# Patient Record
Sex: MALE | Race: WHITE | NOT HISPANIC OR LATINO | Employment: UNEMPLOYED | URBAN - METROPOLITAN AREA
[De-identification: names, ages, dates, MRNs, and addresses within clinical notes are randomized per-mention and may not be internally consistent; named-entity substitution may affect disease eponyms.]

---

## 2019-03-29 ENCOUNTER — TRANSCRIBE ORDERS (OUTPATIENT)
Dept: ADMINISTRATIVE | Facility: HOSPITAL | Age: 34
End: 2019-03-29

## 2019-03-29 DIAGNOSIS — G47.30 SLEEP APNEA, UNSPECIFIED TYPE: Primary | ICD-10-CM

## 2019-06-11 ENCOUNTER — TRANSCRIBE ORDERS (OUTPATIENT)
Dept: SLEEP CENTER | Facility: CLINIC | Age: 34
End: 2019-06-11

## 2019-06-11 DIAGNOSIS — G47.30 SLEEP APNEA: Primary | ICD-10-CM

## 2019-06-13 ENCOUNTER — OFFICE VISIT (OUTPATIENT)
Dept: SLEEP CENTER | Facility: CLINIC | Age: 34
End: 2019-06-13
Payer: COMMERCIAL

## 2019-06-13 VITALS
DIASTOLIC BLOOD PRESSURE: 78 MMHG | BODY MASS INDEX: 41.75 KG/M2 | WEIGHT: 315 LBS | HEIGHT: 73 IN | HEART RATE: 74 BPM | SYSTOLIC BLOOD PRESSURE: 132 MMHG

## 2019-06-13 DIAGNOSIS — G47.33 OSA (OBSTRUCTIVE SLEEP APNEA): Primary | ICD-10-CM

## 2019-06-13 DIAGNOSIS — K21.9 CHRONIC GERD: ICD-10-CM

## 2019-06-13 DIAGNOSIS — J45.909 ASTHMA DUE TO SEASONAL ALLERGIES: ICD-10-CM

## 2019-06-13 DIAGNOSIS — E66.01 CLASS 3 SEVERE OBESITY DUE TO EXCESS CALORIES WITHOUT SERIOUS COMORBIDITY WITH BODY MASS INDEX (BMI) OF 45.0 TO 49.9 IN ADULT (HCC): ICD-10-CM

## 2019-06-13 PROCEDURE — 99244 OFF/OP CNSLTJ NEW/EST MOD 40: CPT | Performed by: PSYCHIATRY & NEUROLOGY

## 2019-06-13 RX ORDER — ESCITALOPRAM OXALATE 5 MG/1
TABLET ORAL
Refills: 1 | COMMUNITY
Start: 2019-06-06

## 2019-06-13 RX ORDER — FLUTICASONE PROPIONATE 50 MCG
2 SPRAY, SUSPENSION (ML) NASAL
COMMUNITY
Start: 2015-09-10

## 2019-06-13 RX ORDER — PREDNISONE 10 MG/1
TABLET ORAL
Refills: 0 | COMMUNITY
Start: 2019-06-10

## 2019-06-13 RX ORDER — FLUTICASONE FUROATE AND VILANTEROL 100; 25 UG/1; UG/1
1 POWDER RESPIRATORY (INHALATION) DAILY
COMMUNITY
Start: 2018-11-12 | End: 2019-11-12

## 2019-06-13 RX ORDER — DIPHENHYDRAMINE HCL 25 MG
12.5 CAPSULE ORAL DAILY PRN
COMMUNITY

## 2019-06-13 RX ORDER — CETIRIZINE HYDROCHLORIDE 10 MG/1
TABLET ORAL
Refills: 29 | COMMUNITY
Start: 2019-06-07

## 2019-06-13 RX ORDER — OMEPRAZOLE 20 MG/1
CAPSULE, DELAYED RELEASE ORAL
COMMUNITY
Start: 2019-04-08

## 2019-06-13 RX ORDER — UREA 40 %
CREAM (GRAM) TOPICAL
Refills: 5 | COMMUNITY
Start: 2019-05-20

## 2019-06-13 RX ORDER — CEPHALEXIN 500 MG/1
500 CAPSULE ORAL 3 TIMES DAILY
COMMUNITY
Start: 2019-06-10 | End: 2019-06-17

## 2019-06-16 ENCOUNTER — HOSPITAL ENCOUNTER (OUTPATIENT)
Dept: SLEEP CENTER | Facility: CLINIC | Age: 34
Discharge: HOME | End: 2019-06-16
Payer: COMMERCIAL

## 2019-06-16 DIAGNOSIS — G47.33 OSA (OBSTRUCTIVE SLEEP APNEA): ICD-10-CM

## 2019-06-16 PROCEDURE — 95811 POLYSOM 6/>YRS CPAP 4/> PARM: CPT

## 2019-06-16 PROCEDURE — 95811 POLYSOM 6/>YRS CPAP 4/> PARM: CPT | Performed by: PSYCHIATRY & NEUROLOGY

## 2019-06-19 DIAGNOSIS — G47.33 OSA (OBSTRUCTIVE SLEEP APNEA): Primary | ICD-10-CM

## 2019-06-20 ENCOUNTER — TELEPHONE (OUTPATIENT)
Dept: SLEEP CENTER | Facility: CLINIC | Age: 34
End: 2019-06-20

## 2019-06-20 DIAGNOSIS — G47.33 OSA (OBSTRUCTIVE SLEEP APNEA): Primary | ICD-10-CM

## 2019-10-21 ENCOUNTER — OFFICE VISIT (OUTPATIENT)
Dept: SLEEP CENTER | Facility: CLINIC | Age: 34
End: 2019-10-21
Payer: COMMERCIAL

## 2019-10-21 VITALS
WEIGHT: 315 LBS | SYSTOLIC BLOOD PRESSURE: 130 MMHG | HEART RATE: 70 BPM | DIASTOLIC BLOOD PRESSURE: 78 MMHG | HEIGHT: 73 IN | BODY MASS INDEX: 41.75 KG/M2

## 2019-10-21 DIAGNOSIS — G47.33 OBSTRUCTIVE SLEEP APNEA TREATED WITH CONTINUOUS POSITIVE AIRWAY PRESSURE (CPAP): Primary | ICD-10-CM

## 2019-10-21 DIAGNOSIS — Z99.89 OBSTRUCTIVE SLEEP APNEA TREATED WITH CONTINUOUS POSITIVE AIRWAY PRESSURE (CPAP): Primary | ICD-10-CM

## 2019-10-21 PROCEDURE — 99214 OFFICE O/P EST MOD 30 MIN: CPT | Performed by: NURSE PRACTITIONER

## 2019-10-21 RX ORDER — MELOXICAM 15 MG/1
15 TABLET ORAL DAILY
COMMUNITY
Start: 2019-10-09 | End: 2020-10-08

## 2019-10-21 NOTE — PROGRESS NOTES
Progress Note - [facility] 29 y o  male   :1985, MRN: [id number]  10/21/2019          Follow Up Evaluation / Problem:     Severe Obstructive Sleep Apnea  Morbid Obesity      Thank you for the opportunity of participating in the evaluation and care of this patient in the Sleep Clinic at [facility]  HPI: Con Ontiveros is a 29y o  year old male  He presents with his wife for follow-up of severe obstructive sleep apnea  Was originally diagnosed with severe KIKE 5 years ago at [facility]  He used CPAP for a brief period of time however he had difficulty tolerating the use of the equipment  Due to loud snoring, frequent nighttime awakenings and excessive daytime sleepiness he decided to try the equipment again, however it was old and he was unsure if it was working properly  He completed a split night sleep study in  in order to be able to receive new equipment  He is here today to review the results of the study and compliance data with the new equipment      Review of Systems      Genitourinary need to urinate more than twice a night   Cardiology none   Gastrointestinal none   Neurology none   Constitutional none   Integumentary none   Psychiatry anxiety and depression   Musculoskeletal back pain   Pulmonary snoring   ENT none   Endocrine none   Hematological none       Current Outpatient Medications:     cetirizine (ZyrTEC) 10 mg tablet, TAKE 1 TABLET BY MOUTH EVERY DAY FOR ALLERGY, Disp: , Rfl: 29    diphenhydrAMINE (BENADRYL ALLERGY) 25 mg capsule, Take 12 5 mg by mouth daily as needed, Disp: , Rfl:     escitalopram (LEXAPRO) 5 mg tablet, TAKE 1 TABLET (5MG TOTAL) BY MOUTH DAILY, Disp: , Rfl: 1    fluticasone (FLONASE) 50 mcg/act nasal spray, 2 sprays into each nostril, Disp: , Rfl:     fluticasone-vilanterol (BREO ELLIPTA) 100-25 mcg/inh inhaler, Inhale 1 puff daily, Disp: , Rfl:     meloxicam (MOBIC) 15 mg tablet, Take 15 mg by mouth daily, Disp: , Rfl:     omeprazole (PriLOSEC) 20 mg delayed release capsule, TAKE 1 CAPSULES BY MOUTH EVERY DAY , Disp: , Rfl:     predniSONE 10 mg tablet, , Disp: , Rfl: 0    urea (CARMOL) 40 %, APPLY D TO RIGHT HEELS, Disp: , Rfl: 5    [place] Sleepiness Scale  Sitting and reading: Slight chance of dozing  Watching TV: Would never doze  Sitting, inactive in a public place (e g  a theatre or a meeting): Slight chance of dozing  As a passenger in a car for an hour without a break: High chance of dozing  Lying down to rest in the afternoon when circumstances permit: Moderate chance of dozing  Sitting and talking to someone: Would never doze  Sitting quietly after a lunch without alcohol: Slight chance of dozing  In a car, while stopped for a few minutes in traffic: Would never doze  Total score: 8              Vitals:    10/21/19 1400   BP: 130/78   Pulse: 70   Weight: (!) 164 kg (362 lb)   Height: 6' 1" (1 854 m)       Body mass index is 47 76 kg/m²  Neck Circumference: 18       EPWORTH SLEEPINESS SCORE  Total score: 8      Past History Since Last Sleep Center Visit:     He denies any changes to his health since his last visit  He was not set up with CPAP equipment immediately after the study  He reports that he has been using it since set up and feels that it is working well for him he is not having difficulty tolerating the mask or settings of 13 cm of water pressure  His daytime sleepiness has improved from 11 down to 8  He reports that he has been washing the equipment with mild soap and water and has changed the filter  Results of the split night study, completed on June 16, 2019, are as follows:  During the diagnostic portion of the tracing sleep latency was 1 minutes  Stage REM sleep was not identified  Sleep  efficiency was 90 3%  A total of 59 hypopneas were identified  The AHI was 58 5 events per hour   During the titration  portion of the study sleep latency was 0 5 minutes  REM latency was 186 5 minutes  Sleep efficiency was 90 2%  No  periodic limb movements were identified  Sixty-six arousals were recorded  The arousal index was 10 6 events per hour  Nasal CPAP was started at 4 cm and gradually increased to 13 cm of water for control of snoring and abnormal breathing  Best results were obtained using 13 cm of CPAP delivered through medium [employer] AirFit P 10 nasal pillows  Continued use of this equipment using these settings is recommended  The review of systems and following portions of the patient's history were reviewed and updated as appropriate: allergies, current medications, past family history, past medical history, past social history, past surgical history, and problem list         OBJECTIVE    PAP Pressure: Nasal CPAP set to deliver Thirteen cm of water pressure  DME Provider: [employer]    Physical Exam:     General Appearance:   Alert, cooperative, no distress, appears stated age, morbidly obese     Head:   Normocephalic, without obvious abnormality, atraumatic     Eyes:   PERRL, conjunctiva/corneas clear, EOM's intact          Nose:  Nares normal, septum midline, no drainage or sinus tenderness           Throat:  Lips, teeth and gums normal; tongue normal size and  shape and midline mucosa moist redundant bilaterally, uvula thick and only partially visible, tonsils not visualized, Mallampati class 4       Neck:  Supple, symmetrical, trachea midline, no adenopathy;  Thyroid: No enlargement, tenderness or nodules; no carotid bruit or JVD     Lungs:      Clear to auscultation bilaterally, respirations unlabored     Heart:   Regular rate and rhythm, S1 and S2 normal, no murmur, rub or gallop       Extremities:  Extremities normal, atraumatic, no cyanosis or edema       Skin:  Skin color, texture, turgor normal, no rashes or lesions       Neurologic:  No focal deficits noted ASSESSMENT / PLAN    1  Obstructive sleep apnea treated with continuous positive airway pressure (CPAP)  PAP DME Resupply/Reorder   2  BMI 45 0-49 9, adult ([facility] )  PAP DME Resupply/Reorder       Counseling / Coordination of Care  Total clinic time spent today 25 minutes  Greater than 50% of total time was spent with the patient and / or family counseling and / or coordination of care  A description of the counseling / coordination of care:     Impressions, Diagnostic results, Prognosis, Instructions for management, Risks and benefits of treatment, Patient and family education, Risk factor reductions and Importance of compliance with treatment    I reviewed the recent test results with the patient  We talked about the abnormal findings, including those consistent with Obstructive Sleep Apnea  We then discussed how the these are categorized as mild, moderate or severe  We also covered the medical comorbidities associated with untreated Obstructive Sleep Apnea including, hypertension, cardiac arrythmia, myocardial infarction and stroke  I explained how the risk of these conditions increases with the severity of the test results  I also spoke in some detail about the most common treatment options including weight loss, nasal PAP, mandibular advancement devices and uvulopalatopharyngoplasty (UPPP)  I answered all questions posed to me and gave my opinion regarding the best options for treatment based on the patient and their level of disease  He has been using CPAP and tolerating it well  He did experience some oral dryness when he accidentally turned off the humidifier  He realized this occurred and increased moisture which improved the dryness  Today I reviewed the patient's compliance data  he has been able to use the equipment 93 3 % of all days recorded  Average usage was 4 or more hours 93 3 % of all days recorded  The estimated AHI is 1 7 abnormal breathing events per hour    Response to treatment has been very good  Supplies have been ordered for the next year  He will continue using this equipment at the settings noted above for the next 6 months  At that time he will then return for a routine follow-up evaluation  I have asked the patient to contact the [address] if they encounter any difficulties prior to that time  The following instructions have been given to the patient today:    Patient Instructions   1  Continue use of CPAP equipment nightly  2  Continue to clean your equipment, as discussed  3  Contact the [address] with any questions or concerns prior to your next visit, as needed  4  Schedule visit for follow-up in 6 months      You may call [employer] directly for supplies at  [phone] ext [phone]      Nursing Support:  When: Monday through Friday 7A-5PM except holidays  Where: Our direct line is [phone]  If you are having a true emergency please call 911  In the event that the line is busy or it is after hours please leave a voice message and we will return your call  Please speak clearly, leaving your full name, birth date, best number to reach you and the reason for your call  Medication refills: We will need the name of the medication, the dosage, the ordering provider, whether you get a 30 or 90 day refill, and the pharmacy name and address  Medications will be ordered by the provider only  Nurses cannot call in prescriptions  Please allow 7 days for medication refills  Physician requested updates: If your provider requested that you call with an update after starting medication, please be ready to provide us the medication and dosage, what time you take your medication, the time you attempt to fall asleep, time you fall asleep, when you wake up, and what time you get out of bed  Sleep Study Results: We will contact you with sleep study results and/or next steps after the physician has reviewed your testing        Susie Tobias [address] [facility]

## 2019-10-21 NOTE — PATIENT INSTRUCTIONS
1   Continue use of CPAP equipment nightly  2  Continue to clean your equipment, as discussed  3  Contact the Sleep [address] [place] [address] with any questions or concerns prior to your next visit, as needed  4  Schedule visit for follow-up in 6 months      You may call [employer] directly for supplies at  [phone] ext [phone]      Nursing Support:  When: Monday through Friday 7A-5PM except holidays  Where: Our direct line is [phone]  If you are having a true emergency please call 911  In the event that the line is busy or it is after hours please leave a voice message and we will return your call  Please speak clearly, leaving your full name, birth date, best number to reach you and the reason for your call  Medication refills: We will need the name of the medication, the dosage, the ordering provider, whether you get a 30 or 90 day refill, and the pharmacy name and address  Medications will be ordered by the provider only  Nurses cannot call in prescriptions  Please allow 7 days for medication refills  Physician requested updates: If your provider requested that you call with an update after starting medication, please be ready to provide us the medication and dosage, what time you take your medication, the time you attempt to fall asleep, time you fall asleep, when you wake up, and what time you get out of bed  Sleep Study Results: We will contact you with sleep study results and/or next steps after the physician has reviewed your testing

## 2019-10-22 ENCOUNTER — TELEPHONE (OUTPATIENT)
Dept: SLEEP CENTER | Facility: CLINIC | Age: 34
End: 2019-10-22

## 2019-12-11 ENCOUNTER — OFFICE VISIT (OUTPATIENT)
Dept: URGENT CARE | Age: 34
End: 2019-12-11
Payer: COMMERCIAL

## 2019-12-11 VITALS
OXYGEN SATURATION: 95 % | HEART RATE: 83 BPM | SYSTOLIC BLOOD PRESSURE: 134 MMHG | DIASTOLIC BLOOD PRESSURE: 73 MMHG | TEMPERATURE: 97 F | HEIGHT: 73 IN | WEIGHT: 315 LBS | BODY MASS INDEX: 41.75 KG/M2 | RESPIRATION RATE: 19 BRPM

## 2019-12-11 DIAGNOSIS — J20.8 ACUTE BRONCHITIS, BACTERIAL: Primary | ICD-10-CM

## 2019-12-11 DIAGNOSIS — B96.89 ACUTE BRONCHITIS, BACTERIAL: Primary | ICD-10-CM

## 2019-12-11 PROCEDURE — G0382 LEV 3 HOSP TYPE B ED VISIT: HCPCS | Performed by: NURSE PRACTITIONER

## 2019-12-11 PROCEDURE — 99283 EMERGENCY DEPT VISIT LOW MDM: CPT | Performed by: NURSE PRACTITIONER

## 2019-12-11 PROCEDURE — 99203 OFFICE O/P NEW LOW 30 MIN: CPT | Performed by: NURSE PRACTITIONER

## 2019-12-11 RX ORDER — CYCLOBENZAPRINE HCL 5 MG
TABLET ORAL
Refills: 0 | COMMUNITY
Start: 2019-10-09

## 2019-12-11 RX ORDER — OMEPRAZOLE 10 MG/1
CAPSULE, DELAYED RELEASE ORAL
COMMUNITY
Start: 2019-10-04

## 2019-12-11 RX ORDER — FLUTICASONE PROPIONATE 50 MCG
2 SPRAY, SUSPENSION (ML) NASAL
COMMUNITY
Start: 2019-08-06

## 2019-12-11 RX ORDER — UREA 40 %
CREAM (GRAM) TOPICAL
COMMUNITY
Start: 2015-11-09

## 2019-12-11 RX ORDER — MELOXICAM 15 MG/1
TABLET ORAL
COMMUNITY
Start: 2019-11-07

## 2019-12-11 RX ORDER — AZITHROMYCIN 250 MG/1
TABLET, FILM COATED ORAL
Qty: 6 TABLET | Refills: 0 | Status: SHIPPED | OUTPATIENT
Start: 2019-12-11 | End: 2019-12-15

## 2019-12-11 RX ORDER — FLUTICASONE FUROATE AND VILANTEROL 100; 25 UG/1; UG/1
POWDER RESPIRATORY (INHALATION)
COMMUNITY
Start: 2019-06-19

## 2019-12-11 RX ORDER — ESCITALOPRAM OXALATE 5 MG/1
TABLET ORAL
COMMUNITY
Start: 2019-06-06

## 2019-12-11 NOTE — PATIENT INSTRUCTIONS
You appear to have bronchitis  You have been prescribed azithromycin - take as prescribed  You may take robitussin cough medication for an expectorant or mucinex  Increase water  Take tylenol or motrin as needed for pain or fever  Follow up with your PCP  Go to the ED if symptoms worsen    Acute Bronchitis   WHAT YOU NEED TO KNOW:   Acute bronchitis is swelling and irritation in the air passages of your lungs  This irritation may cause you to cough or have other breathing problems  Acute bronchitis often starts because of another illness, such as a cold or the flu  The illness spreads from your nose and throat to your windpipe and airways  Bronchitis is often called a chest cold  Acute bronchitis lasts about 3 to 6 weeks and is usually not a serious illness  Your cough can last for several weeks  DISCHARGE INSTRUCTIONS:   Return to the emergency department if:   · You cough up blood  · Your lips or fingernails turn blue  · You feel like you are not getting enough air when you breathe  Contact your healthcare provider if:   · You have a fever  · Your breathing problems do not go away or get worse  · Your cough does not get better within 4 weeks  · You have questions or concerns about your condition or care  Self-care:   · Get more rest   Rest helps your body to heal  Slowly start to do more each day  Rest when you feel it is needed  · Avoid irritants in the air  Avoid chemicals, fumes, and dust  Wear a face mask if you must work around dust or fumes  Stay inside on days when air pollution levels are high  If you have allergies, stay inside when pollen counts are high  Do not use aerosol products, such as spray-on deodorant, bug spray, and hair spray  · Do not smoke or be around others who smoke  Nicotine and other chemicals in cigarettes and cigars damages the cilia that move mucus out of your lungs  Ask your healthcare provider for information if you currently smoke and need help to quit  E-cigarettes or smokeless tobacco still contain nicotine  Talk to your healthcare provider before you use these products  · Drink liquids as directed  Liquids help keep your air passages moist and help you cough up mucus  You may need to drink more liquids when you have acute bronchitis  Ask how much liquid to drink each day and which liquids are best for you  · Use a humidifier or vaporizer  Use a cool mist humidifier or a vaporizer to increase air moisture in your home  This may make it easier for you to breathe and help decrease your cough  Decrease risk for acute bronchitis:   · Get the vaccinations you need  Ask your healthcare provider if you should get vaccinated against the flu or pneumonia  · Prevent the spread of germs  You can decrease your risk of acute bronchitis and other illnesses by doing the following:     [facility] your hands often with soap and water  Carry germ-killing hand lotion or gel with you  You can use the lotion or gel to clean your hands when soap and water are not available  ¨ Do not touch your eyes, nose, or mouth unless you have washed your hands first     ¨ Always cover your mouth when you cough to prevent the spread of germs  It is best to cough into a tissue or your shirt sleeve instead of into your hand  Ask those around you cover their mouths when they cough  ¨ Try to avoid people who have a cold or the flu  If you are sick, stay away from others as much as possible  Medicines: Your healthcare provider may  give you any of the following:  · Ibuprofen or acetaminophen  are medicines that help lower your fever  They are available without a doctor's order  Ask your healthcare provider which medicine is right for you  Ask how much to take and how often to take it  Follow directions  These medicines can cause stomach bleeding if not taken correctly  Ibuprofen can cause kidney damage   Do not take ibuprofen if you have kidney disease, an ulcer, or allergies to aspirin  Acetaminophen can cause liver damage  Do not take more than 4,000 milligrams in 24 hours  · Decongestants  help loosen mucus in your lungs and make it easier to cough up  This can help you breathe easier  · Cough suppressants  decrease your urge to cough  If your cough produces mucus, do not take a cough suppressant unless your healthcare provider tells you to  Your healthcare provider may suggest that you take a cough suppressant at night so you can rest     · Inhalers  may be given  Your healthcare provider may give you one or more inhalers to help you breathe easier and cough less  An inhaler gives your medicine to open your airways  Ask your healthcare provider to show you how to use your inhaler correctly  · Take your medicine as directed  Contact your healthcare provider if you think your medicine is not helping or if you have side effects  Tell him of her if you are allergic to any medicine  Keep a list of the medicines, vitamins, and herbs you take  Include the amounts, and when and why you take them  Bring the list or the pill bottles to follow-up visits  Carry your medicine list with you in case of an emergency  Follow up with your healthcare provider as directed:  Write down questions you have so you will remember to ask them during your follow-up visits  © 2017 [address] [employer] [employer] Information is for End User's use only and may not be sold, redistributed or otherwise used for commercial purposes  All illustrations and images included in CareNotes® are the copyrighted property of [employer]  or [employer]  The above information is an  only  It is not intended as medical advice for individual conditions or treatments  Talk to your doctor, nurse or pharmacist before following any medical regimen to see if it is safe and effective for you

## 2019-12-11 NOTE — PROGRESS NOTES
[facility]        NAME: Dino Barrow is a 29 y o  male  : 1985    MRN: [id number]  DATE: 2019  TIME: 3:17 PM    Assessment and Plan   Acute bronchitis, bacterial [J20 8, B96 89]  1  Acute bronchitis, bacterial  azithromycin (ZITHROMAX) 250 mg tablet         Patient Instructions       Follow up with PCP in 3-5 days  Proceed to  ER if symptoms worsen  You appear to have bronchitis  You have been prescribed azithromycin - take as prescribed  You may take robitussin cough medication for an expectorant or mucinex  Increase water  Take tylenol or motrin as needed for pain or fever  Follow up with your PCP  Go to the ED if symptoms worsen      Chief Complaint     Chief Complaint   Patient presents with    Cold Like Symptoms     x1 1/2 wks, started with sore throat  pt now has productive cough, head/nasal congestion, b/l ear fullness/pressure, and denies fevers/chills/bodyaches  pt taking dayquil and aleve         History of Present Illness       This is a 29year old obese male who states has been ill with cough, chest congestions, fevers, nasal congestion, ear fullness x 2 weeks  He states he has been using OTC products with out relief  He states he has had family members ill with similar symptoms  Review of Systems   Review of Systems   Constitutional: Positive for chills and fever  HENT: Positive for congestion and sore throat  Eyes: Negative  Respiratory: Positive for cough and chest tightness  Cardiovascular: Negative  Gastrointestinal: Negative  Endocrine: Negative  Genitourinary: Negative  Musculoskeletal: Negative  Skin: Negative  Allergic/Immunologic: Negative  Neurological: Negative  Hematological: Negative  Psychiatric/Behavioral: Negative            Current Medications       Current Outpatient Medications:     cetirizine (ZyrTEC) 10 mg tablet, TAKE 1 TABLET BY MOUTH EVERY DAY FOR ALLERGY, Disp: , Rfl: 29    escitalopram (LEXAPRO) 5 mg tablet, TAKE 1 TABLET (5MG TOTAL) BY MOUTH DAILY, Disp: , Rfl: 1    fluticasone (FLONASE) 50 mcg/act nasal spray, 2 sprays into each nostril, Disp: , Rfl:     fluticasone-vilanterol (BREO ELLIPTA) 100-25 mcg/inh inhaler, TAKE 1 PUFF BY MOUTH EVERY DAY, Disp: , Rfl:     omeprazole (PRILOSEC) 10 mg delayed release capsule, TAKE 1 CAPSULES BY MOUTH EVERY DAY , Disp: , Rfl:     urea (CARMOL) 40 %, APPLY D TO RIGHT HEELS, Disp: , Rfl: 5    azithromycin (ZITHROMAX) 250 mg tablet, Take 2 tablets today then 1 tablet daily x 4 days, Disp: 6 tablet, Rfl: 0    cyclobenzaprine (FLEXERIL) 5 mg tablet, , Disp: , Rfl: 0    diphenhydrAMINE (BENADRYL ALLERGY) 25 mg capsule, Take 12 5 mg by mouth daily as needed, Disp: , Rfl:     escitalopram (LEXAPRO) 5 mg tablet, TAKE 1 TABLET (5MG TOTAL) BY MOUTH DAILY, Disp: , Rfl:     fluticasone (FLONASE) 50 mcg/act nasal spray, 2 sprays into each nostril, Disp: , Rfl:     fluticasone-vilanterol (BREO ELLIPTA) 100-25 mcg/inh inhaler, Inhale 1 puff daily, Disp: , Rfl:     meloxicam (MOBIC) 15 mg tablet, Take 15 mg by mouth daily, Disp: , Rfl:     meloxicam (MOBIC) 15 mg tablet, TAKE 1 TABLET(15 MG) BY MOUTH DAILY, Disp: , Rfl:     omeprazole (PriLOSEC) 20 mg delayed release capsule, TAKE 1 CAPSULES BY MOUTH EVERY DAY , Disp: , Rfl:     predniSONE 10 mg tablet, , Disp: , Rfl: 0    urea (CARMOL) 40 %, APPLY D TO RIGHT HEELS, Disp: , Rfl:     Current Allergies     Allergies as of 12/11/2019    (No Known Allergies)            The following portions of the patient's history were reviewed and updated as appropriate: allergies, current medications, past family history, past medical history, past social history, past surgical history and problem list      History reviewed  No pertinent past medical history  History reviewed  No pertinent surgical history      Family History   Problem Relation Age of Onset   Julieann Frankel Migraines Mother     Cancer Father    Julieann Frankel Migraines Sister    Julieann Frankel Migraines Brother     Cancer Paternal Grandfather          Medications have been verified  Objective   /73 (BP Location: Left arm, Patient Position: Sitting, Cuff Size: Adult)   Pulse 83   Temp (!) 97 °F (36 1 °C) (Tympanic)   Resp 19   Ht 6' 1" (1 854 m)   Wt (!) 162 kg (358 lb 3 2 oz)   SpO2 95%   BMI 47 26 kg/m²        Physical Exam     Physical Exam   Constitutional: He is oriented to person, place, and time  He appears well-developed and well-nourished  No distress  HENT:   Head: Normocephalic and atraumatic  Right Ear: External ear normal    Left Ear: External ear normal    Mouth/Throat: No oropharyngeal exudate  oropharynx injected    Eyes: Pupils are equal, round, and reactive to light  EOM are normal    Neck: Normal range of motion  Neck supple  Cardiovascular: Normal rate, regular rhythm and normal heart sounds  Pulmonary/Chest: Effort normal  He has rales  Abdominal: Soft  Bowel sounds are normal    Musculoskeletal: Normal range of motion  Neurological: He is alert and oriented to person, place, and time  Skin: Skin is warm and dry  Capillary refill takes less than 2 seconds  He is not diaphoretic  Psychiatric: He has a normal mood and affect  His behavior is normal  Judgment and thought content normal    Nursing note and vitals reviewed

## 2020-04-20 ENCOUNTER — TRANSCRIBE ORDERS (OUTPATIENT)
Dept: SLEEP CENTER | Facility: CLINIC | Age: 35
End: 2020-04-20

## 2020-04-20 DIAGNOSIS — G47.33 OBSTRUCTIVE SLEEP APNEA (ADULT) (PEDIATRIC): Primary | ICD-10-CM

## 2020-04-20 DIAGNOSIS — Z99.89 DEPENDENCE ON OTHER ENABLING MACHINES AND DEVICES: ICD-10-CM

## 2022-12-07 ENCOUNTER — OFFICE VISIT (OUTPATIENT)
Dept: SLEEP CENTER | Facility: CLINIC | Age: 37
End: 2022-12-07

## 2022-12-07 VITALS — HEIGHT: 73 IN | WEIGHT: 315 LBS | BODY MASS INDEX: 41.75 KG/M2

## 2022-12-07 DIAGNOSIS — Z99.89 DEPENDENCE ON OTHER ENABLING MACHINES AND DEVICES: ICD-10-CM

## 2022-12-07 DIAGNOSIS — R09.82 ALLERGIC RHINITIS WITH POSTNASAL DRIP: ICD-10-CM

## 2022-12-07 DIAGNOSIS — E66.01 MORBID OBESITY (HCC): ICD-10-CM

## 2022-12-07 DIAGNOSIS — G47.33 OBSTRUCTIVE SLEEP APNEA (ADULT) (PEDIATRIC): Primary | ICD-10-CM

## 2022-12-07 DIAGNOSIS — F41.9 ANXIETY AND DEPRESSION: ICD-10-CM

## 2022-12-07 DIAGNOSIS — F32.A ANXIETY AND DEPRESSION: ICD-10-CM

## 2022-12-07 DIAGNOSIS — G47.21 DELAYED SLEEP PHASE SYNDROME: ICD-10-CM

## 2022-12-07 DIAGNOSIS — J30.9 ALLERGIC RHINITIS WITH POSTNASAL DRIP: ICD-10-CM

## 2022-12-07 RX ORDER — BUPROPION HYDROCHLORIDE 300 MG/1
300 TABLET ORAL
COMMUNITY
Start: 2022-11-23

## 2022-12-07 RX ORDER — BUSPIRONE HYDROCHLORIDE 10 MG/1
10 TABLET ORAL 3 TIMES DAILY
COMMUNITY
Start: 2022-11-13

## 2022-12-07 NOTE — PATIENT INSTRUCTIONS

## 2022-12-07 NOTE — PROGRESS NOTES
Review of Systems      Genitourinary none   Cardiology none   Gastrointestinal frequent heartburn/acid reflux   Neurology none   Constitutional none   Integumentary none   Psychiatry anxiety and depression   Musculoskeletal joint pain and back pain   Pulmonary snoring   ENT throat clearing   Endocrine none   Hematological none

## 2022-12-07 NOTE — PROGRESS NOTES
Consultation - Sleep Center   Meghan Correa  40 y o  male  [place]  SDW:[id number]  DOS:12/7/2022    Physician Requesting Consult: Fred Blackwell MD             Reason for Consult : At your kind request I saw Meghan Correa for initial sleep evaluation today  He was diagnosed with obstructive sleep apnea several years ago and prescribed CPAP  He was last seen here in 2019 and returns to re-establish care  Results of a split study in 2019: The diagnostic portion demonstrated an AHI of 58 5/h with minimum oxygen saturation of 81% and 89 3% of this portion of the study spent with saturations below 90%  During the therapeutic portion, sleep disordered breathing appeared to be adequately controlled with CPAP at 13 cm H2O  PFSH, Problem List, Medications & Allergies were reviewed in EMR  Amena Venus  has no past medical history on file  He has a current medication list which includes the following prescription(s): bupropion, buspirone, escitalopram, escitalopram, fluticasone, fluticasone, omeprazole, cetirizine, cyclobenzaprine, diphenhydramine, fluticasone-vilanterol, meloxicam, omeprazole, prednisone, urea, and urea  HPI: He is using a DreamStation 1 machine and got a replacement from [employer] over a year ago  He reports dry nose and mouth spite of using the humidifier  Patient reports has not been using [employer] to sanitize the machine  He is using CPAP regularly and benefits from use  Compliance data shows use for more than 4 hours 93% of the time  Respiratory event index is 3 1/h at 13 cm H2O  Other Complaints: None  Restless Leg Syndrome: reports no suggestive symptoms  Parasomnia: no features reported    Sleep Routine (on average): 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  Typical Bedtime: 3 AM gets OOB: Noon TIB:9 hrs  Sleep latency:<  45 minutes Sleep Interruptions:none/nite  Awakens: needing an alarm  Upon awakening: feels refreshed    Amena Donovan denies excessive daytime sleepiness no napping  He rated [himself] at Total score: 10 /24 on the [place] Sleepiness Scale  Habits:  reports that he has never smoked  He has never used smokeless tobacco ; [  E-Cigarette/Vaping   ]; [ reports no history of drug use ];  reports current alcohol use ; Caffeine use:limited; Exercise routine: none  Family History: Negative for sleep disturbance  ROS - reviewed and as attached  Significant for 3 pounds weight gain  He has postnasal drip for which she is on medication  He reported no respiratory or cardiac symptoms  Acid reflux is controlled on Prilosec  He has musculoskeletal aches and pains  He is on medication for anxiety and depression  EXAM:  BP (P) 138/80   Pulse (P) 70   Ht 6' 1" (1 854 m)   Wt (!) 172 kg (380 lb)   SpO2 (P) 97%   BMI 50 13 kg/m²    General: Well groomed male, well appearing, in no apparent distress  Neurological: Alert and orientated; cooperative; Cranial nerves intact;    Psychiatric: Speech: Clear and coherent; normal mood, affect & thought   Skin: Warm and dry; Color& Hydration good; no facial rashes or lesions   HEENT:  Craniofacial anatomy: normal Sinuses: Non-tender  TMJ: Normal   Eyes: EOM's intact; conjunctiva/corneas clear   Ears: Externally appear normal     Nasal Airway: is patent Septum: Intact; Mucosa: Normal; Turbinates: Normal; Rhinorrhea: None  Mouth: Lips: Normal posture; Dentition: normal   Mucosa: Moist; Hard Palate:normal    Oropharryx: crowdedTongue: Mallampati:Class IV, Mobile, Macroglossia and ScallopedSoft Palate:  redundant  Tonsils: absent  Neck:[is thick;] [  ";] Supple; no abnormal masses; Thyroid: Normal  Trachea: Central     Lymph: No cervical or submandibular Lymhadenopathy  Heart: S1,S2 normal; RRR; no gallop; no murmur s  Lungs: Respiratory Effort: Normal  Air entry good bilaterally  No wheezes  No rales  Abdomen: Obese, soft & non-tender    Extremities: No pedal edema  No clubbing or cyanosis  Musculoskeletal:  Motor normal; Gait: Normal        IMPRESSION: Primary/Secondary Sleep Diagnoses (to Medical or Psych conditions) & Comorbidities   1  Obstructive sleep apnea (adult) (pediatric)  Ambulatory Referral to Sleep Medicine    PAP DME Resupply/Reorder      2  Dependence on other enabling machines and devices  Ambulatory Referral to Sleep Medicine      3  Delayed sleep phase syndrome        4  Allergic rhinitis with postnasal drip        5  Anxiety and depression        6  Morbid obesity ([facility] [address] )             PLAN:  1  I reviewed results of the Sleep study with the patient  2  With respect to above conditions, I counseled on pathophysiology, diagnosis, treatment options, risks and benefits; inter-relationship and effects on symptoms and comorbidities; risks of no treatment; costs and insurance aspects  3  Patient elected to continue positive airway pressure therapy and it is medically necessary  Pressure settin cm H2O    4   Nasal symptoms may improve with regular nasal saline rinse up to twice a day, followed by topical nasal steroid (e g  OTC Flonase, Nasacort) once a day if necessary  He also plans to see ENT  5  I advised on weight reduction and offered referral to weight management but he declined  6   Advised on strategies to advance his sleep phase  7  Follow-up to be scheduled in a year or sooner if needed to monitor progress and to adjust therapy  Sincerely,      Authenticated electronically on    Board Certified Specialist     Portions of the record may have been created with voice recognition software  Occasional wrong word or "sound a like" substitutions may have occurred due to the inherent limitations of voice recognition software  There may also be notations and random deletions of words or characters from malfunctioning software  Read the chart carefully and recognize, using context, where substitutions/deletions have occurred

## 2022-12-12 ENCOUNTER — TELEPHONE (OUTPATIENT)
Dept: SLEEP CENTER | Facility: CLINIC | Age: 37
End: 2022-12-12

## 2022-12-14 LAB

## 2023-04-07 ENCOUNTER — TELEPHONE (OUTPATIENT)
Dept: SLEEP CENTER | Facility: CLINIC | Age: 38
End: 2023-04-07

## 2023-04-07 NOTE — TELEPHONE ENCOUNTER
Returned the [employer]  He left message that his AHI events are increased to 10  Left call back message that I requested compliance information from his machine and it will be given to Dr Luis Alberto Lino to review   Nursing will call him back with Dr Tomás Ram recommendations

## 2023-04-12 NOTE — TELEPHONE ENCOUNTER
Per reply from Dr Jennifer Brown:  Data shows respiratory event index is in an acceptable range -5 1/h at 13 cm H2O   He can carry on at this setting or if he wants I can increase the pressure to 14 cm H2O    ------------------------------------------------------    Called patient and left detailed message advising of Dr Eve Mcelroy reply  Advised patient that if he prefers a pressure increase to 14cm, to call back and I will send a message to Dr Jennifer Brown to write an order for the change  Nurse line number provided in message

## 2023-05-01 DIAGNOSIS — G47.33 OBSTRUCTIVE SLEEP APNEA (ADULT) (PEDIATRIC): Primary | ICD-10-CM

## 2023-05-02 LAB
DME PARACHUTE DELIVERY DATE REQUESTED: NORMAL
DME PARACHUTE ITEM DESCRIPTION: NORMAL
DME PARACHUTE ORDER STATUS: NORMAL
DME PARACHUTE SUPPLIER NAME: NORMAL
DME PARACHUTE SUPPLIER PHONE: NORMAL

## 2023-09-11 ENCOUNTER — TELEPHONE (OUTPATIENT)
Dept: SLEEP CENTER | Facility: CLINIC | Age: 38
End: 2023-09-11

## 2023-09-11 NOTE — TELEPHONE ENCOUNTER
Patient left message stating his sleep quality has been very poor recently and inquired whether a pressure change may be required. Compliance obtained and scanned to media. Dr. Sully Koehler, please review and advise. Thank you.

## 2023-09-12 NOTE — TELEPHONE ENCOUNTER
Left call back message for the patient that pressure change is not needed per Dr. Lissy De La Fuente.  Patient can make a sooner compliance appointment if her wants

## 2023-11-01 ENCOUNTER — TELEPHONE (OUTPATIENT)
Dept: SLEEP CENTER | Facility: CLINIC | Age: 38
End: 2023-11-01

## 2023-11-01 NOTE — TELEPHONE ENCOUNTER
Patient left message stating his CPAP is currently set to 14cm. He doesn't feel that the CPAP is providing therapeutic benefits. He is still snoring and disturbing his wife. Spoke to patient who states his quality of sleep has recently taken "a huge dip". His wife tells him he snores for long periods of time, approximately 45 minutes at a time. He wakes up less alert and less refreshed. He feels tired earlier in the evening. Follow up visit is currently scheduled 12/18/23. Searched for sooner appointment in both [facility] and [facility] offices but none currently available. Compliance data requested from [address] [place] [address] for Dr. Jovita Larry to review. Advised patient that nursing will contact him once Dr. Jovita Larry replies with his recommendations. Dr. Jovita Larry, please review compliance data and advise.

## 2023-11-06 DIAGNOSIS — G47.33 OBSTRUCTIVE SLEEP APNEA (ADULT) (PEDIATRIC): Primary | ICD-10-CM

## 2023-11-06 NOTE — TELEPHONE ENCOUNTER
Called patient and left message advising of pressure change order. [facility] via [place].

## 2023-11-07 LAB
DME PARACHUTE DELIVERY DATE ACTUAL: NORMAL
DME PARACHUTE DELIVERY DATE REQUESTED: NORMAL
DME PARACHUTE ITEM DESCRIPTION: NORMAL
DME PARACHUTE ORDER STATUS: NORMAL
DME PARACHUTE SUPPLIER NAME: NORMAL
DME PARACHUTE SUPPLIER PHONE: NORMAL

## 2023-11-08 ENCOUNTER — TELEPHONE (OUTPATIENT)
Dept: PULMONOLOGY | Facility: CLINIC | Age: 38
End: 2023-11-08

## 2024-01-31 ENCOUNTER — OFFICE VISIT (OUTPATIENT)
Dept: SLEEP CENTER | Facility: CLINIC | Age: 39
End: 2024-01-31
Payer: COMMERCIAL

## 2024-01-31 VITALS
OXYGEN SATURATION: 97 % | HEART RATE: 77 BPM | HEIGHT: 73 IN | BODY MASS INDEX: 41.75 KG/M2 | SYSTOLIC BLOOD PRESSURE: 118 MMHG | DIASTOLIC BLOOD PRESSURE: 68 MMHG | WEIGHT: 315 LBS

## 2024-01-31 DIAGNOSIS — F41.9 ANXIETY AND DEPRESSION: ICD-10-CM

## 2024-01-31 DIAGNOSIS — E66.01 MORBID OBESITY (HCC): ICD-10-CM

## 2024-01-31 DIAGNOSIS — R09.82 ALLERGIC RHINITIS WITH POSTNASAL DRIP: ICD-10-CM

## 2024-01-31 DIAGNOSIS — F32.A ANXIETY AND DEPRESSION: ICD-10-CM

## 2024-01-31 DIAGNOSIS — G47.21 DELAYED SLEEP PHASE SYNDROME: ICD-10-CM

## 2024-01-31 DIAGNOSIS — Z99.89 DEPENDENCE ON OTHER ENABLING MACHINES AND DEVICES: ICD-10-CM

## 2024-01-31 DIAGNOSIS — G47.33 OBSTRUCTIVE SLEEP APNEA (ADULT) (PEDIATRIC): Primary | ICD-10-CM

## 2024-01-31 DIAGNOSIS — J30.9 ALLERGIC RHINITIS WITH POSTNASAL DRIP: ICD-10-CM

## 2024-01-31 PROCEDURE — 99214 OFFICE O/P EST MOD 30 MIN: CPT | Performed by: INTERNAL MEDICINE

## 2024-01-31 RX ORDER — ESCITALOPRAM OXALATE 20 MG/1
TABLET ORAL
COMMUNITY
Start: 2024-01-11

## 2024-01-31 NOTE — PROGRESS NOTES
"  Follow-Up Note - Sleep Center   Marc Rodriguez  38 y.o. male  :1985  MRN:[id number]  DOS:2024    CC: I saw this patient for follow-up in clinic today for Sleep disordered breathing, Coexisting Sleep and Medical Problems.  Interval changes: Patient received ot a refurbished dream Station Version 1 machine from [employer] over a year ago.    Results of a split study in 2019: The diagnostic portion demonstrated an AHI of 58.5/h with minimum oxygen saturation of 81% and 89.3% of this portion of the study spent with saturations below 90%.  During the therapeutic portion, sleep disordered breathing appeared to be adequately controlled with CPAP at 13 cm H2O.    PFSH, Problem List, Medications & Allergies were reviewed in EMR.   He  has no past medical history on file.    He has a current medication list which includes the following prescription(s): bupropion, buspirone, escitalopram, omeprazole, cetirizine, cyclobenzaprine, diphenhydramine, escitalopram, escitalopram, fluticasone, fluticasone, fluticasone-vilanterol, meloxicam, omeprazole, prednisone, urea, and urea.    PHYSIOLOGICAL DATA REVIEW : Using PAP > 4 hours/night 90%. Estimated GRICELDA 2.6/hour with pressure of 17.6cm H2O@90th/95th percentile; patient has not been using non FDA approved devices to sanitize the machine.  INTERPRETATION: Compliance is excellent; Pressure setting is:within target range; ;   SUBJECTIVE: With respect to use of PAP, Marc  is experiencing some adverse effects:dry mouth/throat.  Wife reports occasional snoring with CPAP.He derives benefit.  Is satisfied with sleep and daytime function.   Sleep Routine: Marc reports getting 8 hrs sleep; he has no difficulty initiating or maintaining sleep , but is unable to fall asleep before around 3 AM.. He arises needing an alarm and usually feels refreshed.Marc denies daytime sleepiness, \"unless I do not get enough sleep \" He rated himself at Total score: 7 /24 on " "the [place] Sleepiness Scale.   Other issues: None reported.     Habits: Reports that he has never smoked. He has never used smokeless tobacco.,  Reports current alcohol use.,  Reports no history of drug use., Caffeine use:limited; Exercise routine: none but is physically active.      ROS: Significant for weight has been stable.  He awakens with mucus congestion in the mornings.  He is on omeprazole for acid reflux.  He is reporting no respiratory or cardiac symptoms.  Mood is stable on current medications..    EXAM: /68 (BP Location: Right arm, Patient Position: Sitting, Cuff Size: Large)   Pulse 77   Ht 6' 1\" (1.854 m)   Wt (!) 173 kg (382 lb)   SpO2 97%   BMI 50.40 kg/m²     Wt Readings from Last 3 Encounters:   01/31/24 (!) 173 kg (382 lb)   12/07/22 (!) 172 kg (380 lb)   12/11/19 (!) 162 kg (358 lb 3.2 oz)      Patient is well groomed; well appearing.   CNS: Alert, orientated, speech clear & coherent  Psych: cooperative and in no distress. Mental state: Appears normal.  H&N: EOMI; NC/AT: No facial pressure marks, no rashes.    Skin/Extrem: col & hydration normal; no edema  Resp: Respiratory effort is normal  Physical findings otherwise essentially unchanged from previous.    IMPRESSION: Problem List Items & Comorbidities Addressed this Visit    1. Obstructive sleep apnea (adult) (pediatric)  PAP DME Resupply/Reorder    PAP DME Pressure Change      2. Dependence on other enabling machines and devices        3. Delayed sleep phase syndrome        4. Allergic rhinitis with postnasal drip        5. Anxiety and depression        6. Morbid obesity (HCC)            PLAN:  I reviewed results of prior studies and physiologic data with the patient.   I discussed treatment options with risks and benefits.  Treatment with  PAP is medically necessary and Angeler is agreable to continue use.   Care of equipment, methods to improve comfort using PAP and importance of compliance with therapy were " "discussed.  Pressure setting: change 16-19 cmH2O.    Rx provided to replace supplies and Care coordinated with DME provider.   Nasal symptoms may improve with regular nasal saline rinse up to twice a day, followed by topical nasal steroid (e..g. OTC Flonase, Nasacort) once a day if necessary.  Strategies to improve dry mouth were discussed. Specifically, adequate treatment of nasal allergies, adjusting heat and humidity settings on PAP machine, using Biotene mouthwash &/or Xylimelt.  Advised on strategies to advance his sleep phase.  Discussed strategies for weight reduction.    Follow-up is advised in 1 year or sooner if needed to monitor progress, compliance and to adjust therapy.     Thank you for allowing me to participate in the care of this patient.    Sincerely,     Authenticated electronically on 01/31/24   Board Certified Specialist     Portions of the record may have been created with voice recognition software. Occasional wrong word or \"sound a like\" substitutions may have occurred due to the inherent limitations of voice recognition software. There may also be notations and random deletions of words or characters from malfunctioning software. Read the chart carefully and recognize, using context, where substitutions/deletions have occurred.    "

## 2024-01-31 NOTE — PATIENT INSTRUCTIONS
Strategies to improve dry mouth were discussed. Specifically, adequate treatment of nasal allergies, adjusting heat and humidity settings on PAP machine, using Biotene mouthwash &/or Xylimelt.  Nursing Support:  When: Monday through Friday 7A-5PM except holidays  Where: Our direct line is [phone].    If you are having a true emergency please call 911.  In the event that the line is busy or it is after hours please leave a voice message and we will return your call.  Please speak clearly, leaving your full name, birth date, best number to reach you and the reason for your call.   Medication refills: We will need the name of the medication, the dosage, the ordering provider, whether you get a 30 or 90 day refill, and the pharmacy name and address.  Medications will be ordered by the provider only.  Nurses cannot call in prescriptions.  Please allow 7 days for medication refills.  Physician requested updates: If your provider requested that you call with an update after starting medication, please be ready to provide us the medication and dosage, what time you take your medication, the time you attempt to fall asleep, time you fall asleep, when you wake up, and what time you get out of bed.  Sleep Study Results: We will contact you with sleep study results and/or next steps after the physician has reviewed your testing.

## 2024-02-01 ENCOUNTER — TELEPHONE (OUTPATIENT)
Dept: SLEEP CENTER | Facility: CLINIC | Age: 39
End: 2024-02-01

## 2024-02-01 NOTE — TELEPHONE ENCOUNTER
RX for pressure change and PAP resupply sent to [facility] via [place].

## 2024-02-02 LAB

## 2024-11-21 ENCOUNTER — TELEPHONE (OUTPATIENT)
Dept: SLEEP CENTER | Facility: CLINIC | Age: 39
End: 2024-11-21

## 2024-11-21 NOTE — TELEPHONE ENCOUNTER
Received application for Medical Assistance for Workers with Disabilities(MAWD) form from patient.       Form placed in Dr. Miguel's inbox in [place] to sign and date.     Completed form and any relevant medical records as evidence of diagnosis, condition and need for continued treatment to [facility] at [phone].  Case # -[id number].    Patient would like to be notified when form is completed so he can pick it up in person.

## 2024-11-29 NOTE — TELEPHONE ENCOUNTER
MAWD form and requested documents faxed to [facility] at [phone].     Call placed to patient, left message to notify MAWD form has been completed and can be picked up at his convenience.

## 2025-02-03 ENCOUNTER — TELEPHONE (OUTPATIENT)
Dept: SLEEP CENTER | Facility: CLINIC | Age: 40
End: 2025-02-03

## 2025-02-03 ENCOUNTER — OFFICE VISIT (OUTPATIENT)
Dept: SLEEP CENTER | Facility: CLINIC | Age: 40
End: 2025-02-03
Payer: COMMERCIAL

## 2025-02-03 VITALS
WEIGHT: 315 LBS | HEIGHT: 73 IN | SYSTOLIC BLOOD PRESSURE: 126 MMHG | BODY MASS INDEX: 41.75 KG/M2 | DIASTOLIC BLOOD PRESSURE: 72 MMHG

## 2025-02-03 DIAGNOSIS — E66.01 MORBID OBESITY (HCC): ICD-10-CM

## 2025-02-03 DIAGNOSIS — G47.33 OBSTRUCTIVE SLEEP APNEA (ADULT) (PEDIATRIC): Primary | ICD-10-CM

## 2025-02-03 DIAGNOSIS — F32.A ANXIETY AND DEPRESSION: ICD-10-CM

## 2025-02-03 DIAGNOSIS — J30.9 ALLERGIC RHINITIS WITH POSTNASAL DRIP: ICD-10-CM

## 2025-02-03 DIAGNOSIS — Z99.89 DEPENDENCE ON CPAP VENTILATION: ICD-10-CM

## 2025-02-03 DIAGNOSIS — R09.82 ALLERGIC RHINITIS WITH POSTNASAL DRIP: ICD-10-CM

## 2025-02-03 DIAGNOSIS — F41.9 ANXIETY AND DEPRESSION: ICD-10-CM

## 2025-02-03 DIAGNOSIS — F51.12 INSUFFICIENT SLEEP SYNDROME: ICD-10-CM

## 2025-02-03 PROCEDURE — 99214 OFFICE O/P EST MOD 30 MIN: CPT | Performed by: INTERNAL MEDICINE

## 2025-02-03 NOTE — TELEPHONE ENCOUNTER
Rx for replacement APAP and clinicals sent to [facility] via [place].

## 2025-02-03 NOTE — PROGRESS NOTES
Name: Marc Rodriguez      : 1985      MRN: [id number]  Encounter Provider: Mik Miguel MD  Encounter Date: 2/3/2025   Encounter department: [facility] SLEEP MEDICINE [place]  :  Assessment & Plan  Obstructive sleep apnea (adult) (pediatric)    Orders:    PAP DME Resupply/Reorder    Cpap DME    Dependence on CPAP ventilation         Insufficient sleep syndrome         Allergic rhinitis with postnasal drip         Anxiety and depression         Morbid obesity (HCC)          PLAN:   Problems & Comorbidities Addressed this Visit as listed.  Stable/controlled conditions reviewed in notes.  I reviewed results of prior studies and physiologic data with the patient.   I discussed treatment options with risks and benefits.  Treatment with  PAP is medically necessary and Marc is agreable to continue use.   Care of equipment, methods to improve comfort using PAP and importance of compliance with therapy were discussed.  Pressure setting: continue 16-19 cmH2O using a fullface mask.    Rx provided to replace supplies and Care coordinated with DME provider.   Discussed strategies for weight reduction.  He declined referral to weight management program.  Follow-up is advised in 1 year or sooner if needed to monitor progress, compliance and to adjust therapy.      History of Present Illness   HPI        Follow-Up Note - Sleep Center   Marc Rodriguez  39 y.o. male  :1985  MRN:[id number]  DOS:2/3/2025    CC: I saw this patient for follow-up in clinic today for Sleep disordered breathing, Coexisting Sleep and Medical Problems.  Patient received ot a refurbished dream Station Version 1 machine from [employer] over a year ago.. Interval changes: None reported.      Results of a split study in 2019: The diagnostic portion demonstrated an AHI of 58.5/h with minimum oxygen saturation of 81% and 89.3% of this portion of the study spent with saturations below 90%.  During the therapeutic portion, sleep  "disordered breathing appeared to be adequately controlled with CPAP at 13 cm H2O.    PFSH, Problem List, Medications & Allergies were reviewed in EMR.   He  has no past medical history on file.    He has a current medication list which includes the following prescription(s): bupropion, buspirone, escitalopram, omeprazole, cetirizine, cyclobenzaprine, diphenhydramine, escitalopram, escitalopram, fluticasone, fluticasone, fluticasone-vilanterol, meloxicam, omeprazole, prednisone, urea, and urea.    PHYSIOLOGICAL DATA REVIEW : Using PAP > 4 hours/night 97%. Estimated GRICELDA 2.8/hour with pressure of 17.5cm H2O@90th/95th percentile;.  INTERPRETATION: Compliance is excellent; Pressure setting is:adequate; ;     SUBJECTIVE: With respect to use of PAP, Marc  is experiencing no adverse effects:.He derives benefit.. Is satisfied with sleep and daytime function.     Sleep Routine: Marc reports getting (Patient-Rptd) (P) 6 hrs sleep; he has no difficulty initiating or maintaining sleep . He arises needing an alarm and (Patient-Rptd) (P) Usually feels refreshed.Marc (Patient-Rptd) (P) Sometimes] reports excessive daytime sleepiness,.  He rated himself at Total score: (Patient-Rptd) (P) 6 /24 on the [place] Sleepiness Scale.   Other issues: None reported.     Habits: Reports that he has never smoked. He has never used smokeless tobacco.,  Reports current alcohol use.,  Reports no history of drug use., Caffeine use: excessive until  10 PM; Exercise routine: regular.      ROS: Significant for fluctuates in the range of a few pounds.  He is no longer having nasal symptoms due to allergies.  Acid reflux is controlled.  Mood is stable on current medications..    EXAM: /72   Ht 6' 1\" (1.854 m)   Wt (!) 175 kg (386 lb)   BMI 50.93 kg/m²     Wt Readings from Last 3 Encounters:   02/03/25 (!) 175 kg (386 lb)   01/31/24 (!) 173 kg (382 lb)   12/07/22 (!) 172 kg (380 lb)      Patient is well groomed; well " "appearing.   CNS: Alert, orientated, speech clear & coherent  Psych: cooperative and in no distress. Mental state: Appears normal.  H&N: EOMI; NC/AT: No facial pressure marks, no rashes.    Skin/Extrem: col & hydration normal; no edema  Resp: Respiratory effort is normal  Physical findings otherwise essentially unchanged from previous.    Sincerely,     Authenticated electronically on 02/03/25   Board Certified Specialist     Portions of the record may have been created with voice recognition software. Occasional wrong word or \"sound a like\" substitutions may have occurred due to the inherent limitations of voice recognition software. There may also be notations and random deletions of words or characters from malfunctioning software. Read the chart carefully and recognize, using context, where substitutions/deletions have occurred.      Sitting and reading: (Patient-Rptd) (P) Slight chance of dozing  Watching TV: (Patient-Rptd) (P) Slight chance of dozing  Sitting, inactive in a public place (e.g. a theatre or a meeting): (Patient-Rptd) (P) Would never doze  As a passenger in a car for an hour without a break: (Patient-Rptd) (P) Moderate chance of dozing  Lying down to rest in the afternoon when circumstances permit: (Patient-Rptd) (P) Moderate chance of dozing  Sitting and talking to someone: (Patient-Rptd) (P) Would never doze  Sitting quietly after a lunch without alcohol: (Patient-Rptd) (P) Would never doze  In a car, while stopped for a few minutes in traffic: (Patient-Rptd) (P) Would never doze  Total score: (Patient-Rptd) (P) 6     Review of Systems  Pertinent positives/negatives included in HPI and also as noted:       Objective   /72   Ht 6' 1\" (1.854 m)   Wt (!) 175 kg (386 lb)   BMI 50.93 kg/m²        Physical Exam    Data  No results found for: \"HGB\", \"HCT\", \"MCV\"   Lab Results   Component Value Date    CALCIUM 9.7 06/09/2024    K 4.2 06/09/2024    CO2 27 06/09/2024     06/09/2024    BUN " "16 06/09/2024    CREATININE 0.97 06/09/2024     No results found for: \"IRON\", \"TIBC\", \"FERRITIN\"  Lab Results   Component Value Date    AST 17 06/09/2024    ALT 13 06/09/2024         "

## 2025-02-04 ENCOUNTER — TELEPHONE (OUTPATIENT)
Dept: SLEEP CENTER | Facility: CLINIC | Age: 40
End: 2025-02-04

## 2025-02-10 LAB
DME PARACHUTE DELIVERY DATE EXPECTED: NORMAL
DME PARACHUTE DELIVERY DATE REQUESTED: NORMAL
DME PARACHUTE DELIVERY DATE REQUESTED: NORMAL
DME PARACHUTE ITEM DESCRIPTION: NORMAL
DME PARACHUTE ORDER STATUS: NORMAL
DME PARACHUTE ORDER STATUS: NORMAL
DME PARACHUTE SUPPLIER NAME: NORMAL
DME PARACHUTE SUPPLIER NAME: NORMAL
DME PARACHUTE SUPPLIER PHONE: NORMAL
DME PARACHUTE SUPPLIER PHONE: NORMAL

## 2025-02-27 LAB

## 2025-04-30 ENCOUNTER — OFFICE VISIT (OUTPATIENT)
Dept: SLEEP CENTER | Facility: CLINIC | Age: 40
End: 2025-04-30
Payer: COMMERCIAL

## 2025-04-30 VITALS
WEIGHT: 315 LBS | DIASTOLIC BLOOD PRESSURE: 72 MMHG | BODY MASS INDEX: 41.75 KG/M2 | HEIGHT: 73 IN | SYSTOLIC BLOOD PRESSURE: 124 MMHG

## 2025-04-30 DIAGNOSIS — F32.A ANXIETY AND DEPRESSION: ICD-10-CM

## 2025-04-30 DIAGNOSIS — F41.9 ANXIETY AND DEPRESSION: ICD-10-CM

## 2025-04-30 DIAGNOSIS — K21.9 GASTROESOPHAGEAL REFLUX DISEASE, UNSPECIFIED WHETHER ESOPHAGITIS PRESENT: ICD-10-CM

## 2025-04-30 DIAGNOSIS — G47.33 OBSTRUCTIVE SLEEP APNEA (ADULT) (PEDIATRIC): Primary | ICD-10-CM

## 2025-04-30 DIAGNOSIS — J30.9 ALLERGIC RHINITIS WITH POSTNASAL DRIP: ICD-10-CM

## 2025-04-30 DIAGNOSIS — F51.12 INSUFFICIENT SLEEP SYNDROME: ICD-10-CM

## 2025-04-30 DIAGNOSIS — E66.01 MORBID OBESITY (HCC): ICD-10-CM

## 2025-04-30 DIAGNOSIS — G47.21 DELAYED SLEEP PHASE SYNDROME: ICD-10-CM

## 2025-04-30 DIAGNOSIS — R09.82 ALLERGIC RHINITIS WITH POSTNASAL DRIP: ICD-10-CM

## 2025-04-30 DIAGNOSIS — Z99.89 DEPENDENCE ON CPAP VENTILATION: ICD-10-CM

## 2025-04-30 PROCEDURE — 99214 OFFICE O/P EST MOD 30 MIN: CPT | Performed by: INTERNAL MEDICINE

## 2025-04-30 NOTE — PROGRESS NOTES
Name: Marc Rodriguez      : 1985      MRN: [id number]  Encounter Provider: Mik Miguel MD  Encounter Date: 2025   Encounter department: [facility] [place]  :  Assessment & Plan  Obstructive sleep apnea (adult) (pediatric)    Orders:    PAP DME Resupply/Reorder    Dependence on CPAP ventilation         Delayed sleep phase syndrome         Insufficient sleep syndrome         Allergic rhinitis with postnasal drip         Anxiety and depression         Gastroesophageal reflux disease, unspecified whether esophagitis present         Morbid obesity (HCC)               PLAN:   Problems & Comorbidities Addressed this Visit as listed.  Above conditions as reviewed in notes are improved/stable/controlled/resolved.  I reviewed results of prior studies and physiologic data with the patient.   I discussed treatment options with risks and benefits.  Treatment with  PAP is medically necessary and Marc is agreable to continue use.   Care of equipment, methods to improve comfort using PAP and importance of compliance with therapy were discussed.  Pressure setting:continue 16-19 cmH2O. Mask type full face  Rx provided to replace supplies and Care coordinated with DME provider.   Discussed strategies for weight reduction.    Follow-up is advised in 1 year or sooner if needed to monitor progress, compliance and to adjust therapy.        History of Present Illness   HPI          Follow-Up Note - Sleep Center   aMrc Rodriguez  39 y.o. male  :1985  MRN:[id number]  DOS:2025    CC: I saw this patient for follow-up in clinic today for Sleep disordered breathing, Coexisting Sleep and Medical Problems.  He is using a ResMed machine.. Interval changes: None reported.      Results of a split study in 2019: The diagnostic portion demonstrated an AHI of 58.5/h with minimum oxygen saturation of 81% and 89.3% of this portion of the study spent with saturations below 90%.  During the  "therapeutic portion, sleep disordered breathing appeared to be adequately controlled with CPAP at 13 cm H2O.    PFSH, Problem List, Medications & Allergies were reviewed in EMR.   He  has no past medical history on file.    He has a current medication list which includes the following prescription(s): bupropion, buspirone, escitalopram, omeprazole, cetirizine, cyclobenzaprine, diphenhydramine, escitalopram, escitalopram, fluticasone, fluticasone, fluticasone-vilanterol, meloxicam, omeprazole, prednisone, urea, and urea.    PHYSIOLOGICAL DATA REVIEW : Using PAP > 4 hours/night 87%. Estimated GRICELDA 1.8/hour with pressure of 18.6cm H2O@90th/95th percentile;.  INTERPRETATION: Compliance is Very good; Pressure setting is:within target range; ;     SUBJECTIVE: With respect to use of PAP, Marc  is experiencing minimal adverse effects: dry mouth/throat.He derives benefit.. Is satisfied with sleep and daytime function.     Sleep Routine: Marc reports getting to bed around 2 AM but now getting 7 hrs sleep on weekdays and more on weekends.; he has no difficulty initiating and maintaining sleep . He arises needing an alarm and usually feels refreshed.Marc]denies excessive daytime sleepiness,.  He rated himself at Total score: 5 /24 on the [place] Sleepiness Scale.   Other issues: None reported.     Habits: Reports that he has never smoked. 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      ROS: Significant for weight is stable within a few pounds.  Allergies and acid reflux are controlled.  Mood is stable on current medications..    EXAM: /72   Ht 6' 1\" (1.854 m)   Wt (!) 175 kg (385 lb 3.2 oz)   BMI 50.82 kg/m²     Wt Readings from Last 3 Encounters:   04/30/25 (!) 175 kg (385 lb 3.2 oz)   02/03/25 (!) 175 kg (386 lb) " "  01/31/24 (!) 173 kg (382 lb)      Patient is well groomed; well appearing.   CNS: Alert, orientated, speech clear & coherent  Psych: cooperative and in no distress. Mental state: Appears normal.  H&N: EOMI; NC/AT: No facial pressure marks, no rashes.    Skin/Extrem: col & hydration normal; no edema  Resp: Respiratory effort is normal  Physical findings otherwise essentially unchanged from previous.    Sincerely,     Authenticated electronically on 04/30/25   Board Certified Specialist     Portions of the record may have been created with voice recognition software. Occasional wrong word or \"sound a like\" substitutions may have occurred due to the inherent limitations of voice recognition software. There may also be notations and random deletions of words or characters from malfunctioning software. Read the chart carefully and recognize, using context, where substitutions/deletions have occurred.          Review of Systems           "

## 2025-05-01 ENCOUNTER — TELEPHONE (OUTPATIENT)
Dept: SLEEP CENTER | Facility: CLINIC | Age: 40
End: 2025-05-01
